# Patient Record
Sex: FEMALE | Race: WHITE | Employment: FULL TIME | URBAN - METROPOLITAN AREA
[De-identification: names, ages, dates, MRNs, and addresses within clinical notes are randomized per-mention and may not be internally consistent; named-entity substitution may affect disease eponyms.]

---

## 2023-09-05 ENCOUNTER — OFFICE VISIT (OUTPATIENT)
Age: 23
End: 2023-09-05

## 2023-09-05 ENCOUNTER — HOSPITAL ENCOUNTER (EMERGENCY)
Facility: HOSPITAL | Age: 23
Discharge: HOME | End: 2023-09-05
Attending: EMERGENCY MEDICINE
Payer: COMMERCIAL

## 2023-09-05 ENCOUNTER — APPOINTMENT (OUTPATIENT)
Facility: HOSPITAL | Age: 23
End: 2023-09-05
Payer: COMMERCIAL

## 2023-09-05 VITALS
HEART RATE: 79 BPM | HEIGHT: 61 IN | TEMPERATURE: 97.7 F | SYSTOLIC BLOOD PRESSURE: 115 MMHG | BODY MASS INDEX: 21.71 KG/M2 | RESPIRATION RATE: 16 BRPM | DIASTOLIC BLOOD PRESSURE: 68 MMHG | OXYGEN SATURATION: 100 % | WEIGHT: 115 LBS

## 2023-09-05 VITALS
SYSTOLIC BLOOD PRESSURE: 121 MMHG | HEART RATE: 85 BPM | TEMPERATURE: 98.4 F | OXYGEN SATURATION: 99 % | DIASTOLIC BLOOD PRESSURE: 70 MMHG | RESPIRATION RATE: 18 BRPM

## 2023-09-05 DIAGNOSIS — R94.31 NONSPECIFIC ABNORMAL ELECTROCARDIOGRAM (ECG) (EKG): ICD-10-CM

## 2023-09-05 DIAGNOSIS — R00.2 PALPITATION: Primary | ICD-10-CM

## 2023-09-05 DIAGNOSIS — R07.9 CHEST PAIN, UNSPECIFIED TYPE: Primary | ICD-10-CM

## 2023-09-05 DIAGNOSIS — R06.02 SOB (SHORTNESS OF BREATH): ICD-10-CM

## 2023-09-05 DIAGNOSIS — R07.89 CHEST TIGHTNESS: ICD-10-CM

## 2023-09-05 LAB
ALBUMIN SERPL-MCNC: 4.2 G/DL (ref 3.5–5)
ALBUMIN/GLOB SERPL: 1.2 (ref 1.1–2.2)
ALP SERPL-CCNC: 46 U/L (ref 45–117)
ALT SERPL-CCNC: 20 U/L (ref 12–78)
ANION GAP SERPL CALC-SCNC: 4 MMOL/L (ref 5–15)
AST SERPL-CCNC: 24 U/L (ref 15–37)
BASOPHILS # BLD: 0.1 K/UL (ref 0–0.1)
BASOPHILS NFR BLD: 1 % (ref 0–1)
BILIRUB SERPL-MCNC: 0.8 MG/DL (ref 0.2–1)
BUN SERPL-MCNC: 11 MG/DL (ref 6–20)
BUN/CREAT SERPL: 13 (ref 12–20)
CALCIUM SERPL-MCNC: 9.4 MG/DL (ref 8.5–10.1)
CHLORIDE SERPL-SCNC: 103 MMOL/L (ref 97–108)
CO2 SERPL-SCNC: 29 MMOL/L (ref 21–32)
COMMENT:: NORMAL
CREAT SERPL-MCNC: 0.84 MG/DL (ref 0.55–1.02)
D DIMER PPP FEU-MCNC: <0.19 MG/L FEU (ref 0–0.65)
DIFFERENTIAL METHOD BLD: ABNORMAL
EKG ATRIAL RATE: 76 BPM
EKG DIAGNOSIS: NORMAL
EKG P AXIS: 68 DEGREES
EKG P-R INTERVAL: 150 MS
EKG Q-T INTERVAL: 394 MS
EKG QRS DURATION: 102 MS
EKG QTC CALCULATION (BAZETT): 443 MS
EKG R AXIS: 81 DEGREES
EKG T AXIS: 53 DEGREES
EKG VENTRICULAR RATE: 76 BPM
EOSINOPHIL # BLD: 0 K/UL (ref 0–0.4)
EOSINOPHIL NFR BLD: 0 % (ref 0–7)
ERYTHROCYTE [DISTWIDTH] IN BLOOD BY AUTOMATED COUNT: 12.6 % (ref 11.5–14.5)
GLOBULIN SER CALC-MCNC: 3.6 G/DL (ref 2–4)
GLUCOSE SERPL-MCNC: 93 MG/DL (ref 65–100)
HCT VFR BLD AUTO: 41.8 % (ref 35–47)
HGB BLD-MCNC: 14.1 G/DL (ref 11.5–16)
IMM GRANULOCYTES # BLD AUTO: 0 K/UL (ref 0–0.04)
IMM GRANULOCYTES NFR BLD AUTO: 0 % (ref 0–0.5)
LYMPHOCYTES # BLD: 1.5 K/UL (ref 0.8–3.5)
LYMPHOCYTES NFR BLD: 30 % (ref 12–49)
MCH RBC QN AUTO: 31.9 PG (ref 26–34)
MCHC RBC AUTO-ENTMCNC: 33.7 G/DL (ref 30–36.5)
MCV RBC AUTO: 94.6 FL (ref 80–99)
MONOCYTES # BLD: 0.6 K/UL (ref 0–1)
MONOCYTES NFR BLD: 12 % (ref 5–13)
NEUTS SEG # BLD: 2.8 K/UL (ref 1.8–8)
NEUTS SEG NFR BLD: 57 % (ref 32–75)
NRBC # BLD: 0 K/UL (ref 0–0.01)
NRBC BLD-RTO: 0 PER 100 WBC
PLATELET # BLD AUTO: 284 K/UL (ref 150–400)
PMV BLD AUTO: 8.6 FL (ref 8.9–12.9)
POTASSIUM SERPL-SCNC: 3.8 MMOL/L (ref 3.5–5.1)
PROT SERPL-MCNC: 7.8 G/DL (ref 6.4–8.2)
RBC # BLD AUTO: 4.42 M/UL (ref 3.8–5.2)
SODIUM SERPL-SCNC: 136 MMOL/L (ref 136–145)
SPECIMEN HOLD: NORMAL
TROPONIN I SERPL HS-MCNC: <4 NG/L (ref 0–51)
WBC # BLD AUTO: 5 K/UL (ref 3.6–11)

## 2023-09-05 PROCEDURE — 93005 ELECTROCARDIOGRAM TRACING: CPT | Performed by: STUDENT IN AN ORGANIZED HEALTH CARE EDUCATION/TRAINING PROGRAM

## 2023-09-05 PROCEDURE — 36415 COLL VENOUS BLD VENIPUNCTURE: CPT

## 2023-09-05 PROCEDURE — 99285 EMERGENCY DEPT VISIT HI MDM: CPT

## 2023-09-05 PROCEDURE — 93010 ELECTROCARDIOGRAM REPORT: CPT | Performed by: INTERNAL MEDICINE

## 2023-09-05 PROCEDURE — 80053 COMPREHEN METABOLIC PANEL: CPT

## 2023-09-05 PROCEDURE — 85025 COMPLETE CBC W/AUTO DIFF WBC: CPT

## 2023-09-05 PROCEDURE — 71046 X-RAY EXAM CHEST 2 VIEWS: CPT

## 2023-09-05 PROCEDURE — 85379 FIBRIN DEGRADATION QUANT: CPT

## 2023-09-05 PROCEDURE — 84484 ASSAY OF TROPONIN QUANT: CPT

## 2023-09-05 ASSESSMENT — ENCOUNTER SYMPTOMS
ALLERGIC/IMMUNOLOGIC NEGATIVE: 1
SHORTNESS OF BREATH: 0
ABDOMINAL PAIN: 0
EYES NEGATIVE: 1
CHEST TIGHTNESS: 1
GASTROINTESTINAL NEGATIVE: 1
DIARRHEA: 0
SORE THROAT: 0
EYE PAIN: 0
VOMITING: 0
NAUSEA: 0
SHORTNESS OF BREATH: 1
COUGH: 0

## 2023-09-05 ASSESSMENT — HEART SCORE: ECG: 0

## 2023-09-05 ASSESSMENT — PAIN - FUNCTIONAL ASSESSMENT: PAIN_FUNCTIONAL_ASSESSMENT: 0-10

## 2023-09-05 ASSESSMENT — PAIN SCALES - GENERAL: PAINLEVEL_OUTOF10: 5

## 2023-09-05 ASSESSMENT — PAIN DESCRIPTION - LOCATION: LOCATION: CHEST

## 2023-09-05 NOTE — ED NOTES
Discharge paperwork reviewed with patient. Patient verbalized understanding. Patient leaves ER ambulatory and in no acute distress.       Nancy Rios RN  09/05/23 [id number]

## 2023-09-05 NOTE — PATIENT INSTRUCTIONS
GO TO ER FOR FURTHER EVALUATION & MANAGEMENT. Discussed potential concerns for: EKG CHANGES, SHORTNESS OF BREATH. Recommend patient to seek care at the nearest emergency department for further evaluation.     Transportation:  private car

## 2023-09-05 NOTE — ED PROVIDER NOTES
[facility] EMERGENCY DEPT  EMERGENCY DEPARTMENT ENCOUNTER      Pt Name: Jeremy Key  MRN: [id number]  [address] [facility] [address]  Date of evaluation: 9/5/2023  Provider: Deena Jones       Chief Complaint   Patient presents with    Chest Pain         HISTORY OF PRESENT ILLNESS   (Location/Symptom, Timing/Onset, Context/Setting, Quality, Duration, Modifying Factors, Severity)  Note limiting factors. 21 y.o. female presents to ED with CP. Patient reports that since yesterday she has felt SOB with general substernal chest tightness. She reports that it was worse last night but when she woke up and her symptoms were still there she was concerned. She reports that she has had similar symptoms before but not as severe. She went to urgent care this morning who recommended she come to ED. She notes that she vapes nicotine but denies any drug use. Social EtOH use. Has an IUD. Has not tried anything for her pain Denies any dizziness, N/V/D, cough, fevers, chills. Review of External Medical Records:     Nursing Notes were reviewed. REVIEW OF SYSTEMS    (2-9 systems for level 4, 10 or more for level 5)     Review of Systems   Constitutional:  Negative for chills and fever. HENT:  Negative for congestion, ear pain and sore throat. Eyes:  Negative for pain. Respiratory:  Negative for cough and shortness of breath. Cardiovascular:  Positive for chest pain. Gastrointestinal:  Negative for abdominal pain, diarrhea, nausea and vomiting. Genitourinary:  Negative for dysuria and flank pain. Musculoskeletal:  Negative for myalgias. Skin:  Negative for rash. Neurological:  Negative for dizziness and headaches. Hematological:  Negative for adenopathy. Except as noted above the remainder of the review of systems was reviewed and negative. PAST MEDICAL HISTORY   No past medical history on file. SURGICAL HISTORY     No past surgical history on file.       CURRENT

## 2023-09-05 NOTE — ED TRIAGE NOTES
Pt arrives to the ER for complaints shortness of breath and chest tightness that started last night. Pt reports that she woke up this morning and was concerned and then proceeded to go to [facility]. Reports that she then had an abnormal EKG and was told to come to the ER.

## 2023-09-05 NOTE — PROGRESS NOTES
TRIAGE NOTE TO [address] [place] [address] COMPLAINT    Chief Complaint   Patient presents with    Chest Pain     SOB, tightness in chest and cannot catch breath. Onset yesterday was drinking Sunday and thought it could be related. MEDICAL DECISION MAKING    Patient presented with   Chief Complaint   Patient presents with    Chest Pain     SOB, tightness in chest and cannot catch breath. Onset yesterday was drinking Sunday and thought it could be related. .  Discussed potential concerns for: Shortness of breath and chest pain, abnormal EKG. Recommend patient to seek care at the nearest emergency department for further evaluation. Transportation:  private car    TEST / PROCEDURES COMPLETED AT URGENT CARE:  1.No results found for this visit on 09/05/23. 2.  EKG done and reviewed. ASSESSMENT / PLAN:    Visit Diagnoses and Associated Orders       Palpitation    -  Primary    DE Electrocardiogram, complete [42683 CPT(R)]           Nonspecific abnormal electrocardiogram (ECG) (EKG)             Chest tightness             SOB (shortness of breath)             ORDERS WITHOUT AN ASSOCIATED DIAGNOSIS    SPIRONOLACTONE PO [69039]      FLUCONAZOLE PO [[id number]]                HPI    Leslie Dk is a 21 y.o. female who presents chest tightness, shortness of breath, palpitations. Patient reports she has been vaping and thinks that this is causing her symptoms. Reports hard time catching her breath. Complains of neck pain and throat pain. CURRENT MEDICATIONS    Current Outpatient Rx   Medication Sig Dispense Refill    SPIRONOLACTONE PO Take 25 mg by mouth daily      FLUCONAZOLE PO Take by mouth         ALLERGIES    No Known Allergies    Review of Systems   Constitutional: Negative. HENT: Negative. Eyes: Negative. Respiratory:  Positive for chest tightness and shortness of breath. Cardiovascular:  Positive for palpitations. Gastrointestinal: Negative. Endocrine: Negative.

## 2023-09-08 ENCOUNTER — TELEPHONE (OUTPATIENT)
Age: 23
End: 2023-09-08

## 2023-09-08 NOTE — TELEPHONE ENCOUNTER
----- Message from Za Dodge sent at 9/5/2023 12:43 PM EDT -----  Regarding: Patient Care Follow Up Call  Please make a follow up call to patient to ensure they have picked up and started taking the prescribed medications. If condition does not improve, please advise patient to follow up with PCP.